# Patient Record
Sex: FEMALE | Race: WHITE | NOT HISPANIC OR LATINO | Employment: UNEMPLOYED | URBAN - METROPOLITAN AREA
[De-identification: names, ages, dates, MRNs, and addresses within clinical notes are randomized per-mention and may not be internally consistent; named-entity substitution may affect disease eponyms.]

---

## 2020-02-26 ENCOUNTER — APPOINTMENT (OUTPATIENT)
Dept: LAB | Facility: HOSPITAL | Age: 40
End: 2020-02-26

## 2020-02-26 ENCOUNTER — TRANSCRIBE ORDERS (OUTPATIENT)
Dept: LAB | Facility: HOSPITAL | Age: 40
End: 2020-02-26

## 2020-02-26 ENCOUNTER — LAB REQUISITION (OUTPATIENT)
Dept: LAB | Facility: HOSPITAL | Age: 40
End: 2020-02-26

## 2020-02-26 DIAGNOSIS — Z3A.25 25 WEEKS GESTATION OF PREGNANCY: Primary | ICD-10-CM

## 2020-02-26 DIAGNOSIS — Z00.00 ROUTINE GENERAL MEDICAL EXAMINATION AT A HEALTH CARE FACILITY: ICD-10-CM

## 2020-02-26 DIAGNOSIS — Z34.92 SECOND TRIMESTER PREGNANCY: ICD-10-CM

## 2020-02-26 PROCEDURE — 36415 COLL VENOUS BLD VENIPUNCTURE: CPT | Performed by: NURSE PRACTITIONER

## 2020-03-24 ENCOUNTER — HOSPITAL ENCOUNTER (INPATIENT)
Facility: HOSPITAL | Age: 40
LOS: 2 days | Discharge: HOME | End: 2020-03-26
Attending: NURSE PRACTITIONER | Admitting: OBSTETRICS & GYNECOLOGY

## 2020-03-24 ENCOUNTER — ANESTHESIA EVENT (OUTPATIENT)
Dept: LABOR AND DELIVERY | Facility: HOSPITAL | Age: 40
End: 2020-03-24

## 2020-03-24 ENCOUNTER — ANESTHESIA (OUTPATIENT)
Dept: LABOR AND DELIVERY | Facility: HOSPITAL | Age: 40
End: 2020-03-24

## 2020-03-24 PROBLEM — O42.919 PRETERM PREMATURE RUPTURE OF MEMBRANES (PPROM) WITH UNKNOWN ONSET OF LABOR: Status: ACTIVE | Noted: 2020-03-24

## 2020-03-24 LAB
ABO GROUP BLD: NORMAL
ALP SERPL-CCNC: 190 U/L (ref 39–117)
ALT SERPL W P-5'-P-CCNC: 19 U/L (ref 1–33)
AMPHET+METHAMPHET UR QL: NEGATIVE
AMPHETAMINES UR QL: NEGATIVE
AST SERPL-CCNC: 32 U/L (ref 1–32)
BARBITURATES UR QL SCN: NEGATIVE
BENZODIAZ UR QL SCN: NEGATIVE
BILIRUB SERPL-MCNC: 0.3 MG/DL (ref 0.2–1.2)
BLD GP AB SCN SERPL QL: NEGATIVE
BUPRENORPHINE SERPL-MCNC: POSITIVE NG/ML
CANNABINOIDS SERPL QL: NEGATIVE
COCAINE UR QL: NEGATIVE
CREAT BLD-MCNC: 0.42 MG/DL (ref 0.57–1)
DEPRECATED RDW RBC AUTO: 47.7 FL (ref 37–54)
ERYTHROCYTE [DISTWIDTH] IN BLOOD BY AUTOMATED COUNT: 13.2 % (ref 12.3–15.4)
HCT VFR BLD AUTO: 39 % (ref 34–46.6)
HGB BLD-MCNC: 12.8 G/DL (ref 12–15.9)
LDH SERPL-CCNC: 227 U/L (ref 135–214)
MCH RBC QN AUTO: 31.7 PG (ref 26.6–33)
MCHC RBC AUTO-ENTMCNC: 32.8 G/DL (ref 31.5–35.7)
MCV RBC AUTO: 96.5 FL (ref 79–97)
METHADONE UR QL SCN: NEGATIVE
OPIATES UR QL: NEGATIVE
OXYCODONE UR QL SCN: NEGATIVE
PCP UR QL SCN: NEGATIVE
PLATELET # BLD AUTO: 406 10*3/MM3 (ref 140–450)
PMV BLD AUTO: 9 FL (ref 6–12)
PROPOXYPH UR QL: NEGATIVE
RBC # BLD AUTO: 4.04 10*6/MM3 (ref 3.77–5.28)
RH BLD: POSITIVE
T&S EXPIRATION DATE: NORMAL
TRICYCLICS UR QL SCN: NEGATIVE
URATE SERPL-MCNC: 3.2 MG/DL (ref 2.4–5.7)
WBC NRBC COR # BLD: 14.42 10*3/MM3 (ref 3.4–10.8)

## 2020-03-24 PROCEDURE — 86900 BLOOD TYPING SEROLOGIC ABO: CPT | Performed by: OBSTETRICS & GYNECOLOGY

## 2020-03-24 PROCEDURE — 82247 BILIRUBIN TOTAL: CPT | Performed by: OBSTETRICS & GYNECOLOGY

## 2020-03-24 PROCEDURE — 51703 INSERT BLADDER CATH COMPLEX: CPT

## 2020-03-24 PROCEDURE — 86850 RBC ANTIBODY SCREEN: CPT | Performed by: OBSTETRICS & GYNECOLOGY

## 2020-03-24 PROCEDURE — 84075 ASSAY ALKALINE PHOSPHATASE: CPT | Performed by: OBSTETRICS & GYNECOLOGY

## 2020-03-24 PROCEDURE — 82565 ASSAY OF CREATININE: CPT | Performed by: OBSTETRICS & GYNECOLOGY

## 2020-03-24 PROCEDURE — 59025 FETAL NON-STRESS TEST: CPT

## 2020-03-24 PROCEDURE — 25010000002 PENICILLIN G POTASSIUM PER 600000 UNITS: Performed by: OBSTETRICS & GYNECOLOGY

## 2020-03-24 PROCEDURE — 36415 COLL VENOUS BLD VENIPUNCTURE: CPT | Performed by: OBSTETRICS & GYNECOLOGY

## 2020-03-24 PROCEDURE — 99221 1ST HOSP IP/OBS SF/LOW 40: CPT | Performed by: OBSTETRICS & GYNECOLOGY

## 2020-03-24 PROCEDURE — 80306 DRUG TEST PRSMV INSTRMNT: CPT | Performed by: OBSTETRICS & GYNECOLOGY

## 2020-03-24 PROCEDURE — 84460 ALANINE AMINO (ALT) (SGPT): CPT | Performed by: OBSTETRICS & GYNECOLOGY

## 2020-03-24 PROCEDURE — 25010000002 ONDANSETRON PER 1 MG: Performed by: OBSTETRICS & GYNECOLOGY

## 2020-03-24 PROCEDURE — 88307 TISSUE EXAM BY PATHOLOGIST: CPT | Performed by: OBSTETRICS & GYNECOLOGY

## 2020-03-24 PROCEDURE — 86901 BLOOD TYPING SEROLOGIC RH(D): CPT | Performed by: OBSTETRICS & GYNECOLOGY

## 2020-03-24 PROCEDURE — 84450 TRANSFERASE (AST) (SGOT): CPT | Performed by: OBSTETRICS & GYNECOLOGY

## 2020-03-24 PROCEDURE — 85027 COMPLETE CBC AUTOMATED: CPT | Performed by: OBSTETRICS & GYNECOLOGY

## 2020-03-24 PROCEDURE — G0480 DRUG TEST DEF 1-7 CLASSES: HCPCS | Performed by: OBSTETRICS & GYNECOLOGY

## 2020-03-24 PROCEDURE — 25010000002 ROPIVACAINE PER 1 MG: Performed by: NURSE ANESTHETIST, CERTIFIED REGISTERED

## 2020-03-24 PROCEDURE — 83615 LACTATE (LD) (LDH) ENZYME: CPT | Performed by: OBSTETRICS & GYNECOLOGY

## 2020-03-24 PROCEDURE — 25010000002 FENTANYL CITRATE (PF) 100 MCG/2ML SOLUTION: Performed by: NURSE ANESTHETIST, CERTIFIED REGISTERED

## 2020-03-24 PROCEDURE — C1755 CATHETER, INTRASPINAL: HCPCS

## 2020-03-24 PROCEDURE — C1755 CATHETER, INTRASPINAL: HCPCS | Performed by: ANESTHESIOLOGY

## 2020-03-24 PROCEDURE — 84550 ASSAY OF BLOOD/URIC ACID: CPT | Performed by: OBSTETRICS & GYNECOLOGY

## 2020-03-24 RX ORDER — DOCUSATE SODIUM 100 MG/1
100 CAPSULE, LIQUID FILLED ORAL 2 TIMES DAILY
Status: DISCONTINUED | OUTPATIENT
Start: 2020-03-24 | End: 2020-03-26 | Stop reason: HOSPADM

## 2020-03-24 RX ORDER — ONDANSETRON 4 MG/1
4 TABLET, FILM COATED ORAL EVERY 6 HOURS PRN
Status: DISCONTINUED | OUTPATIENT
Start: 2020-03-24 | End: 2020-03-24 | Stop reason: HOSPADM

## 2020-03-24 RX ORDER — MORPHINE SULFATE 2 MG/ML
2 INJECTION, SOLUTION INTRAMUSCULAR; INTRAVENOUS
Status: DISCONTINUED | OUTPATIENT
Start: 2020-03-24 | End: 2020-03-24 | Stop reason: HOSPADM

## 2020-03-24 RX ORDER — TRISODIUM CITRATE DIHYDRATE AND CITRIC ACID MONOHYDRATE 500; 334 MG/5ML; MG/5ML
30 SOLUTION ORAL ONCE
Status: DISCONTINUED | OUTPATIENT
Start: 2020-03-24 | End: 2020-03-24 | Stop reason: HOSPADM

## 2020-03-24 RX ORDER — PROMETHAZINE HYDROCHLORIDE 25 MG/ML
12.5 INJECTION, SOLUTION INTRAMUSCULAR; INTRAVENOUS EVERY 4 HOURS PRN
Status: DISCONTINUED | OUTPATIENT
Start: 2020-03-24 | End: 2020-03-25

## 2020-03-24 RX ORDER — ACETAMINOPHEN 325 MG/1
650 TABLET ORAL EVERY 4 HOURS PRN
Status: DISCONTINUED | OUTPATIENT
Start: 2020-03-24 | End: 2020-03-24 | Stop reason: HOSPADM

## 2020-03-24 RX ORDER — SODIUM CHLORIDE 0.9 % (FLUSH) 0.9 %
1-10 SYRINGE (ML) INJECTION AS NEEDED
Status: DISCONTINUED | OUTPATIENT
Start: 2020-03-24 | End: 2020-03-25

## 2020-03-24 RX ORDER — PROMETHAZINE HYDROCHLORIDE 25 MG/ML
12.5 INJECTION, SOLUTION INTRAMUSCULAR; INTRAVENOUS EVERY 6 HOURS PRN
Status: DISCONTINUED | OUTPATIENT
Start: 2020-03-24 | End: 2020-03-24 | Stop reason: HOSPADM

## 2020-03-24 RX ORDER — PROMETHAZINE HYDROCHLORIDE 12.5 MG/1
12.5 SUPPOSITORY RECTAL EVERY 6 HOURS PRN
Status: DISCONTINUED | OUTPATIENT
Start: 2020-03-24 | End: 2020-03-24 | Stop reason: HOSPADM

## 2020-03-24 RX ORDER — PROMETHAZINE HYDROCHLORIDE 12.5 MG/1
12.5 TABLET ORAL EVERY 6 HOURS PRN
Status: DISCONTINUED | OUTPATIENT
Start: 2020-03-24 | End: 2020-03-24 | Stop reason: HOSPADM

## 2020-03-24 RX ORDER — BUPRENORPHINE 2 MG/1
1 TABLET SUBLINGUAL DAILY
COMMUNITY

## 2020-03-24 RX ORDER — OXYTOCIN-SODIUM CHLORIDE 0.9% IV SOLN 30 UNIT/500ML 30-0.9/5 UT/ML-%
650 SOLUTION INTRAVENOUS ONCE
Status: COMPLETED | OUTPATIENT
Start: 2020-03-24 | End: 2020-03-24

## 2020-03-24 RX ORDER — ONDANSETRON 4 MG/1
4 TABLET, FILM COATED ORAL EVERY 6 HOURS PRN
Status: DISCONTINUED | OUTPATIENT
Start: 2020-03-24 | End: 2020-03-26 | Stop reason: HOSPADM

## 2020-03-24 RX ORDER — LIDOCAINE HYDROCHLORIDE 10 MG/ML
5 INJECTION, SOLUTION EPIDURAL; INFILTRATION; INTRACAUDAL; PERINEURAL AS NEEDED
Status: DISCONTINUED | OUTPATIENT
Start: 2020-03-24 | End: 2020-03-24 | Stop reason: HOSPADM

## 2020-03-24 RX ORDER — PENICILLIN G 3000000 [IU]/50ML
3 INJECTION, SOLUTION INTRAVENOUS EVERY 4 HOURS
Status: DISCONTINUED | OUTPATIENT
Start: 2020-03-24 | End: 2020-03-24

## 2020-03-24 RX ORDER — ONDANSETRON 2 MG/ML
4 INJECTION INTRAMUSCULAR; INTRAVENOUS EVERY 6 HOURS PRN
Status: DISCONTINUED | OUTPATIENT
Start: 2020-03-24 | End: 2020-03-25

## 2020-03-24 RX ORDER — IBUPROFEN 600 MG/1
600 TABLET ORAL EVERY 6 HOURS PRN
Status: DISCONTINUED | OUTPATIENT
Start: 2020-03-24 | End: 2020-03-24 | Stop reason: HOSPADM

## 2020-03-24 RX ORDER — ROPIVACAINE HYDROCHLORIDE 2 MG/ML
15 INJECTION, SOLUTION EPIDURAL; INFILTRATION; PERINEURAL CONTINUOUS
Status: DISCONTINUED | OUTPATIENT
Start: 2020-03-24 | End: 2020-03-25

## 2020-03-24 RX ORDER — OXYTOCIN-SODIUM CHLORIDE 0.9% IV SOLN 30 UNIT/500ML 30-0.9/5 UT/ML-%
85 SOLUTION INTRAVENOUS ONCE
Status: COMPLETED | OUTPATIENT
Start: 2020-03-24 | End: 2020-03-24

## 2020-03-24 RX ORDER — ACETAMINOPHEN 325 MG/1
650 TABLET ORAL EVERY 4 HOURS PRN
Status: DISCONTINUED | OUTPATIENT
Start: 2020-03-24 | End: 2020-03-26 | Stop reason: HOSPADM

## 2020-03-24 RX ORDER — SODIUM CHLORIDE, SODIUM LACTATE, POTASSIUM CHLORIDE, CALCIUM CHLORIDE 600; 310; 30; 20 MG/100ML; MG/100ML; MG/100ML; MG/100ML
125 INJECTION, SOLUTION INTRAVENOUS CONTINUOUS
Status: DISCONTINUED | OUTPATIENT
Start: 2020-03-24 | End: 2020-03-25

## 2020-03-24 RX ORDER — FENTANYL CITRATE 50 UG/ML
INJECTION, SOLUTION INTRAMUSCULAR; INTRAVENOUS AS NEEDED
Status: DISCONTINUED | OUTPATIENT
Start: 2020-03-24 | End: 2020-03-24 | Stop reason: SURG

## 2020-03-24 RX ORDER — PRENATAL WITH FERROUS FUM AND FOLIC ACID 3080; 920; 120; 400; 22; 1.84; 3; 20; 10; 1; 12; 200; 27; 25; 2 [IU]/1; [IU]/1; MG/1; [IU]/1; MG/1; MG/1; MG/1; MG/1; MG/1; MG/1; UG/1; MG/1; MG/1; MG/1; MG/1
1 TABLET ORAL DAILY
COMMUNITY

## 2020-03-24 RX ORDER — DIPHENHYDRAMINE HYDROCHLORIDE 50 MG/ML
12.5 INJECTION INTRAMUSCULAR; INTRAVENOUS EVERY 8 HOURS PRN
Status: DISCONTINUED | OUTPATIENT
Start: 2020-03-24 | End: 2020-03-24 | Stop reason: HOSPADM

## 2020-03-24 RX ORDER — SODIUM CHLORIDE 0.9 % (FLUSH) 0.9 %
3 SYRINGE (ML) INJECTION EVERY 12 HOURS SCHEDULED
Status: DISCONTINUED | OUTPATIENT
Start: 2020-03-24 | End: 2020-03-24 | Stop reason: HOSPADM

## 2020-03-24 RX ORDER — OXYTOCIN-SODIUM CHLORIDE 0.9% IV SOLN 30 UNIT/500ML 30-0.9/5 UT/ML-%
2-24 SOLUTION INTRAVENOUS
Status: DISCONTINUED | OUTPATIENT
Start: 2020-03-24 | End: 2020-03-25

## 2020-03-24 RX ORDER — LANOLIN
CREAM (ML) TOPICAL
Status: DISCONTINUED | OUTPATIENT
Start: 2020-03-24 | End: 2020-03-25

## 2020-03-24 RX ORDER — LIDOCAINE HYDROCHLORIDE AND EPINEPHRINE 15; 5 MG/ML; UG/ML
INJECTION, SOLUTION EPIDURAL AS NEEDED
Status: DISCONTINUED | OUTPATIENT
Start: 2020-03-24 | End: 2020-03-24 | Stop reason: SURG

## 2020-03-24 RX ORDER — SIMETHICONE 80 MG
80 TABLET,CHEWABLE ORAL 4 TIMES DAILY PRN
Status: DISCONTINUED | OUTPATIENT
Start: 2020-03-24 | End: 2020-03-26 | Stop reason: HOSPADM

## 2020-03-24 RX ORDER — ONDANSETRON 2 MG/ML
4 INJECTION INTRAMUSCULAR; INTRAVENOUS ONCE AS NEEDED
Status: DISCONTINUED | OUTPATIENT
Start: 2020-03-24 | End: 2020-03-24 | Stop reason: HOSPADM

## 2020-03-24 RX ORDER — OXYCODONE HYDROCHLORIDE AND ACETAMINOPHEN 5; 325 MG/1; MG/1
2 TABLET ORAL EVERY 4 HOURS PRN
Status: DISCONTINUED | OUTPATIENT
Start: 2020-03-24 | End: 2020-03-24 | Stop reason: HOSPADM

## 2020-03-24 RX ORDER — ONDANSETRON 2 MG/ML
4 INJECTION INTRAMUSCULAR; INTRAVENOUS EVERY 6 HOURS PRN
Status: DISCONTINUED | OUTPATIENT
Start: 2020-03-24 | End: 2020-03-24 | Stop reason: HOSPADM

## 2020-03-24 RX ORDER — PROMETHAZINE HYDROCHLORIDE 12.5 MG/1
12.5 TABLET ORAL EVERY 4 HOURS PRN
Status: DISCONTINUED | OUTPATIENT
Start: 2020-03-24 | End: 2020-03-26 | Stop reason: HOSPADM

## 2020-03-24 RX ORDER — NICOTINE 21 MG/24HR
1 PATCH, TRANSDERMAL 24 HOURS TRANSDERMAL
Status: DISCONTINUED | OUTPATIENT
Start: 2020-03-24 | End: 2020-03-24 | Stop reason: SDUPTHER

## 2020-03-24 RX ORDER — SODIUM CHLORIDE 0.9 % (FLUSH) 0.9 %
10 SYRINGE (ML) INJECTION AS NEEDED
Status: DISCONTINUED | OUTPATIENT
Start: 2020-03-24 | End: 2020-03-24 | Stop reason: HOSPADM

## 2020-03-24 RX ORDER — NICOTINE 21 MG/24HR
1 PATCH, TRANSDERMAL 24 HOURS TRANSDERMAL
Status: DISCONTINUED | OUTPATIENT
Start: 2020-03-24 | End: 2020-03-25

## 2020-03-24 RX ORDER — EPHEDRINE SULFATE/0.9% NACL/PF 25 MG/5 ML
10 SYRINGE (ML) INTRAVENOUS
Status: DISCONTINUED | OUTPATIENT
Start: 2020-03-24 | End: 2020-03-24 | Stop reason: HOSPADM

## 2020-03-24 RX ORDER — MAGNESIUM CARB/ALUMINUM HYDROX 105-160MG
30 TABLET,CHEWABLE ORAL ONCE
Status: DISCONTINUED | OUTPATIENT
Start: 2020-03-24 | End: 2020-03-24 | Stop reason: HOSPADM

## 2020-03-24 RX ORDER — IBUPROFEN 600 MG/1
600 TABLET ORAL EVERY 6 HOURS PRN
Status: DISCONTINUED | OUTPATIENT
Start: 2020-03-24 | End: 2020-03-26 | Stop reason: HOSPADM

## 2020-03-24 RX ORDER — BUPRENORPHINE 2 MG/1
1 TABLET SUBLINGUAL DAILY
Status: DISCONTINUED | OUTPATIENT
Start: 2020-03-24 | End: 2020-03-26 | Stop reason: HOSPADM

## 2020-03-24 RX ORDER — FAMOTIDINE 10 MG/ML
20 INJECTION, SOLUTION INTRAVENOUS ONCE AS NEEDED
Status: DISCONTINUED | OUTPATIENT
Start: 2020-03-24 | End: 2020-03-24 | Stop reason: HOSPADM

## 2020-03-24 RX ADMIN — NICOTINE 1 PATCH: 14 PATCH, EXTENDED RELEASE TRANSDERMAL at 04:21

## 2020-03-24 RX ADMIN — DOCUSATE SODIUM 100 MG: 100 CAPSULE, LIQUID FILLED ORAL at 18:25

## 2020-03-24 RX ADMIN — IBUPROFEN 600 MG: 600 TABLET, FILM COATED ORAL at 18:25

## 2020-03-24 RX ADMIN — ONDANSETRON 4 MG: 2 INJECTION INTRAMUSCULAR; INTRAVENOUS at 09:52

## 2020-03-24 RX ADMIN — PENICILLIN G 3 MILLION UNITS: 3000000 INJECTION, SOLUTION INTRAVENOUS at 13:11

## 2020-03-24 RX ADMIN — OXYTOCIN 2 MILLI-UNITS/MIN: 10 INJECTION, SOLUTION INTRAMUSCULAR; INTRAVENOUS at 09:49

## 2020-03-24 RX ADMIN — SODIUM CHLORIDE, POTASSIUM CHLORIDE, SODIUM LACTATE AND CALCIUM CHLORIDE 125 ML/HR: 600; 310; 30; 20 INJECTION, SOLUTION INTRAVENOUS at 04:10

## 2020-03-24 RX ADMIN — OXYCODONE HYDROCHLORIDE AND ACETAMINOPHEN 2 TABLET: 5; 325 TABLET ORAL at 16:48

## 2020-03-24 RX ADMIN — PENICILLIN G 3 MILLION UNITS: 3000000 INJECTION, SOLUTION INTRAVENOUS at 08:35

## 2020-03-24 RX ADMIN — OXYTOCIN 85 ML/HR: 10 INJECTION, SOLUTION INTRAMUSCULAR; INTRAVENOUS at 15:18

## 2020-03-24 RX ADMIN — Medication: at 22:32

## 2020-03-24 RX ADMIN — NICOTINE 1 PATCH: 14 PATCH, EXTENDED RELEASE TRANSDERMAL at 17:05

## 2020-03-24 RX ADMIN — ROPIVACAINE HYDROCHLORIDE 15 ML/HR: 2 INJECTION, SOLUTION EPIDURAL; INFILTRATION at 08:48

## 2020-03-24 RX ADMIN — LIDOCAINE HYDROCHLORIDE AND EPINEPHRINE 1 ML: 15; 5 INJECTION, SOLUTION EPIDURAL at 08:44

## 2020-03-24 RX ADMIN — SODIUM CHLORIDE 5 MILLION UNITS: 900 INJECTION INTRAVENOUS at 04:11

## 2020-03-24 RX ADMIN — LIDOCAINE HYDROCHLORIDE AND EPINEPHRINE 3 ML: 15; 5 INJECTION, SOLUTION EPIDURAL at 08:41

## 2020-03-24 RX ADMIN — BUPRENORPHINE HCL 1 MG: 2 TABLET SUBLINGUAL at 19:39

## 2020-03-24 RX ADMIN — OXYTOCIN 650 ML/HR: 10 INJECTION, SOLUTION INTRAMUSCULAR; INTRAVENOUS at 14:45

## 2020-03-24 RX ADMIN — WITCH HAZEL 1 PAD: 500 SOLUTION RECTAL; TOPICAL at 22:32

## 2020-03-24 RX ADMIN — ROPIVACAINE HYDROCHLORIDE 9 ML: 5 INJECTION, SOLUTION EPIDURAL; INFILTRATION; PERINEURAL at 08:46

## 2020-03-24 RX ADMIN — FENTANYL CITRATE 100 MCG: 50 INJECTION, SOLUTION INTRAMUSCULAR; INTRAVENOUS at 08:44

## 2020-03-24 NOTE — ANESTHESIA PROCEDURE NOTES
Labor Epidural      Patient reassessed immediately prior to procedure    Patient location during procedure: OB  Performed By  CRNA: Mary Isabel CRNA  Preanesthetic Checklist  Completed: patient identified, surgical consent, pre-op evaluation, timeout performed, IV checked, risks and benefits discussed and monitors and equipment checked  Prep:  Pt Position:sitting  Sterile Tech:cap, gloves, mask and sterile barrier  Prep:DuraPrep  Monitoring:blood pressure monitoring  Epidural Block Procedure:  Approach:midline  Guidance:palpation technique  Location:L3-L4  Needle Type:Tuohy  Needle Gauge:17 G  Loss of Resistance Medium: saline  Loss of Resistance: 5cm  Cath Depth at skin:9 cm  Paresthesia: none  Aspiration:negative  Test Dose:negative  Number of Attempts: 1  Post Assessment:  Dressing:occlusive dressing applied and secured with tape  Pt Tolerance:patient tolerated the procedure well with no apparent complications  Complications:no

## 2020-03-24 NOTE — PROGRESS NOTES
Comfortable with epidural in place  sve 4/80/-1  Continued clear fluid noted  Contractions irregular  fhr cat 1  Continue gbs prophylaxis antibiotics  Start pitocin augmentation

## 2020-03-24 NOTE — L&D DELIVERY NOTE
[facility]    Vaginal Delivery Note    [facility]      Patient name:  Georgie Bean  : 1980  MRN: [id number]  CSN: [id number]  Date of Service:  20      Patient Active Problem List   Diagnosis   •  premature rupture of membranes (PPROM) with unknown onset of labor     Current Gestational Age:  35w0d    Delivery details     Delivery: Vaginal, Spontaneous     YOB: 2019    Time of Birth: 2:41 PM      Anesthesia: Epidural     Delivering clinician: Lorena Casper    Forceps?   No   Vacuum? No    Shoulder dystocia present: No      Delivery narrative:  Patient delivered a VMI over intact perineum.  Cord milked x 4, doubly clamped and cut. Baby placed on maternal abdomen with vigorous cry.  Placenta delivered spontaneously and intact with 3 a vessel cord.  Uterus firm on bimanual massage.     Infant details    Findings: male  infant     Infant observations: Weight: No birth weight on file.   Length:    in   Apgars:    @ 1 minute /       @ 5 minutes         Placenta, Cord, and Fluid    Placenta delivered  Spontaneous  at   3/24/2020  2:45 PM     Cord: 3 vessels  present.   Nuchal Cord?  no   Cord blood obtained: Yes      Episiotomy, Laceration, and Repair            Estimated Blood Loss:   250 mL           Complications  none    Disposition  Mother to Mother Baby/Postpartum  in stable condition currently.  Baby to NBN  in stable condition currently.      Lorena Casper MD  20  14:57

## 2020-03-24 NOTE — ANESTHESIA PREPROCEDURE EVALUATION
Anesthesia Evaluation     Patient summary reviewed and Nursing notes reviewed   NPO Solid Status: > 2 hours  NPO Liquid Status: > 2 hours           Airway   Mallampati: II  TM distance: >3 FB  Neck ROM: full  No difficulty expected  Dental      Pulmonary - negative pulmonary ROS   Cardiovascular - negative cardio ROS        Neuro/Psych- negative ROS  GI/Hepatic/Renal/Endo - negative ROS     Musculoskeletal (-) negative ROS    Abdominal    Substance History   (+) drug use      Comment: Rx subutex   OB/GYN negative ob/gyn ROS         Other                        Anesthesia Plan    ASA 3     epidural       Anesthetic plan, all risks, benefits, and alternatives have been provided, discussed and informed consent has been obtained with: patient.

## 2020-03-24 NOTE — H&P
YUAN Shen  Obstetric History and Physical    CC:  Contractions and water broke     Subjective     Patient is a 39 y.o. female  currently at 35w0d, who presents with with having several gushes of fluid that started about 21:00.  She continues to leak here with.  Her contractions are not too strong yet, but she does feel some.  She said with her last it, went very quickly and she missed her opportunity for an epidural.    Denies vaginal bleeding, but does have some bloody show with SVE.   She says she has not any complications during this pregnancy.             Prenatal Information:  Prenatal Results     POC Urine Glucose/Protein     Test Value Reference Range Date Time    Urine Glucose        Urine Protein              Initial Prenatal Labs     Test Value Reference Range Date Time    Hemoglobin        Hematocrit        Platelets 406 10*3/mm3 140 - 450 20 0247    Rubella IgG        Hepatitis B SAg        Hepatitis C Ab        RPR        ABO        Rh        Antibody Screen        HIV        Urine Culture        Gonorrhea        Chlamydia        TSH              2nd and 3rd Trimester     Test Value Reference Range Date Time    Hemoglobin (repeated) 12.8 g/dL 12.0 - 15.9 20 0247    Hematocrit (repeated) 39.0 % 34.0 - 46.6 20 0247    GCT        Antibody Screen (repeated)        GTT Fasting        GTT 1 Hr        GTT 2 Hr        GTT 3 Hr        Group B Strep              Drug Screening     Test Value Reference Range Date Time    Amphetamine Screen Negative  Negative 20 0130    Barbiturate Screen Negative  Negative 20 0130    Benzodiazepine Screen Negative  Negative 20 0130    Methadone Screen Negative  Negative 20 0130    Phencyclidine Screen Negative  Negative 20 0130    Opiates Screen Negative  Negative 20 0130    THC Screen Negative  Negative 20 0130    Cocaine Screen Negative  Negative 20 0130    Propoxyphene Screen Negative  Negative 20  0130    Buprenorphine Screen Positive  Negative 03/24/20 0130    Methamphetamine Screen Negative  Negative 03/24/20 0130    Oxycodone Screen Negative  Negative 03/24/20 0130    Tricyclic Antidepressants Screen Negative  Negative 03/24/20 0130          Other (Risk screening)     Test Value Reference Range Date Time    Varicella IgG        Parvovirus IgG        CMV IgG        Cystic Fibrosis        Hemoglobin electrophoresis        NIPT        MSAFP-4        AFP (for NTD only)                  External Prenatal Results     Pregnancy Outside Results - Transcribed From Office Records - See Scanned Records For Details     Test Value Date Time    Hgb 12.8 g/dL 03/24/20 0247    Hct 39.0 % 03/24/20 0247    ABO       Rh       Antibody Screen       Glucose Fasting GTT       Glucose Tolerance Test 1 hour       Glucose Tolerance Test 3 hour       Gonorrhea (discrete)       Chlamydia (discrete)       RPR       VDRL       Syphilis Antibody       Rubella       HBsAg       Herpes Simplex Virus PCR       Herpes Simplex VIrus Culture       HIV       Hep C RNA Quant PCR       Hep C Antibody       AFP       Group B Strep       GBS Susceptibility to Clindamycin       GBS Susceptibility to Erythromycin       Fetal Fibronectin       Genetic Testing, Maternal Blood             Drug Screening     Test Value Date Time    Urine Drug Screen       Amphetamine Screen Negative  03/24/20 0130    Barbiturate Screen Negative  03/24/20 0130    Benzodiazepine Screen Negative  03/24/20 0130    Methadone Screen Negative  03/24/20 0130    Phencyclidine Screen Negative  03/24/20 0130    Opiates Screen Negative  03/24/20 0130    THC Screen Negative  03/24/20 0130    Cocaine Screen       Propoxyphene Screen Negative  03/24/20 0130    Buprenorphine Screen Positive  03/24/20 0130    Methamphetamine Screen       Oxycodone Screen Negative  03/24/20 0130    Tricyclic Antidepressants Screen Negative  03/24/20 0130                 Past OB History:     OB History     Para Term  AB Living   5 4 3 1 0 3   SAB TAB Ectopic Molar Multiple Live Births   0 0 0 0 0 3      # Outcome Date GA Lbr Sanjeev/2nd Weight Sex Delivery Anes PTL Lv   5 Current            4 Term 12 38w0d  2722 g (6 lb) M Vag-Spont EPI  LING   3 Term 08 39w0d  3515 g (7 lb 12 oz) F Vag-Spont EPI  LING   2  04 33w0d  2126 g (4 lb 11 oz) M Vag-Spont   LING   1 Term 99 39w0d  2750 g (6 lb 1 oz) M Vag-Spont EPI         Past Medical History: History reviewed. No pertinent past medical history.   Past Surgical History History reviewed. No pertinent surgical history.   Family History: History reviewed. No pertinent family history.   Social History:  reports that she has been smoking cigarettes. She has been smoking about 0.50 packs per day. She has never used smokeless tobacco.   reports that she does not drink alcohol.   reports that she has current or past drug history.        Review of Systems:  Denies chest pain, and SOA.  All other pertinent positives and negatives are addresses in the subjective       Objective     Vital Signs Range for the last 24 hours  Temperature: Temp:  [98.8 °F (37.1 °C)-99 °F (37.2 °C)] 98.8 °F (37.1 °C)   Temp Source: Temp src: Oral   BP: BP: (133-137)/(77-87) 133/77   Pulse: Heart Rate:  [85-96] 85   Respirations: Resp:  [16-18] 16   SPO2:     O2 Amount (l/min):     O2 Devices     Weight: Weight:  [59 kg (130 lb)] 59 kg (130 lb)     Physical Examination: General appearance - oriented to person, place, and time and anxious  Chest - no tachypnea, retractions or cyanosis  Heart - normal rate and regular rhythm, S1 and S2 normal  Abdomen - soft, nontender, nondistended, no masses or organomegaly  bowel sounds normal  Extremities - no pedal edema noted    Presentation: Cephalic    Cervix: Exam by:  M.D.    Dilation:  3   Effacement:  70   Station:  -1     Fetal Heart Rate Assessment   Method:     Beats/min:     Baseline:  140s   Variability:  mod   Accels:   y   Decels:  n   Tracing Category:  1     Uterine Assessment   Method: Method: per patient report   Frequency (min):     Ctx Count in 10 min:     Duration:     Intensity: Contraction Intensity: irregular   Intensity by IUPC:     Resting Tone:     Resting Tone by IUPC:     [place] Units:       Laboratory Results:   Lab Results   Component Value Date     2020    HGB 12.8 2020    HCT 39.0 2020    WBC 14.42 (H) 2020         Assessment/Plan        premature rupture of membranes (PPROM) with unknown onset of labor      Assessment & Plan    Assessment:  1.  Intrauterine pregnancy at 35w0d gestation with reassuring fetal status.    2.  PPROM   3.  GBS unknown   Plan:   1. Admit   2. IV, CBC, T&S, UDS   3. PCN for GBS   4. Monitor   5. Augment prn   6. Continue home meds       Richard Cao MD  3/24/2020  04:07

## 2020-03-24 NOTE — PAYOR COMM NOTE
"Chelsi Bean (39 y.o. Female)     Auth#[id number]    From: Zee Tyler  #[phone]  Fax#[phone]      Date of Birth Social Security Number Address Home Phone MRN    1980  [id number]  [place] [id number] [phone] [id number]    Anabaptist Marital Status          Hoahaoism        Admission Date Admission Type Admitting Provider Attending Provider Department, Room/Bed    3/24/20 Elective Brooke Tariq DO Tucker, Sabrina, CNM [facility] LABOR DELIVERY, N302/    Discharge Date Discharge Disposition Discharge Destination                       Attending Provider:  Damaris Calhoun CNM    Allergies:  No Known Allergies    Isolation:  None   Infection:  None   Code Status:  CPR    Ht:  157.5 cm (62\")   Wt:  59 kg (130 lb)    Admission Cmt:  None   Principal Problem:  None                Active Insurance as of 3/24/2020     Primary Coverage     Payor Plan Insurance Group Employer/Plan Group    [place] [id number]     Payor Plan Address Payor Plan Phone Number Payor Plan Fax Number Effective Dates    [employer] Claims Office - [address] [address] [phone]  2020 - None Entered    Formerly [facility] [address]       Subscriber Name Subscriber Birth Date Member ID       CHELSI BEAN 1980 [id number]                 Emergency Contacts      (Rel.) Home Phone Work Phone Mobile Phone    ANAYA BEAN (Father) [phone] -- --            Insurance Information                [place] Phone: [phone]    Subscriber: Chelsi Bean Subscriber#: [id number]    Group#: [id number] Precert#:           Problem List           Codes Noted - Resolved       Hospital     premature rupture of membranes (PPROM) with unknown onset of labor ICD-10-CM: O42.919  ICD-9-CM: 658.10 3/24/2020 - Present             History & Physical      Richard Cao MD at 20 0407          [facility]  Obstetric History and " Physical    CC:  Contractions and water broke     Subjective     Patient is a 39 y.o. female  currently at 35w0d, who presents with with having several gushes of fluid that started about 21:00.  She continues to leak here with.  Her contractions are not too strong yet, but she does feel some.  She said with her last it, went very quickly and she missed her opportunity for an epidural.    Denies vaginal bleeding, but does have some bloody show with SVE.   She says she has not any complications during this pregnancy.             Prenatal Information:  Prenatal Results     POC Urine Glucose/Protein     Test Value Reference Range Date Time    Urine Glucose        Urine Protein              Initial Prenatal Labs     Test Value Reference Range Date Time    Hemoglobin        Hematocrit        Platelets 406 10*3/mm3 140 - 450 20 0247    Rubella IgG        Hepatitis B SAg        Hepatitis C Ab        RPR        ABO        Rh        Antibody Screen        HIV        Urine Culture        Gonorrhea        Chlamydia        TSH              2nd and 3rd Trimester     Test Value Reference Range Date Time    Hemoglobin (repeated) 12.8 g/dL 12.0 - 15.9 20 0247    Hematocrit (repeated) 39.0 % 34.0 - 46.6 20 0247    GCT        Antibody Screen (repeated)        GTT Fasting        GTT 1 Hr        GTT 2 Hr        GTT 3 Hr        Group B Strep              Drug Screening     Test Value Reference Range Date Time    Amphetamine Screen Negative  Negative 20 0130    Barbiturate Screen Negative  Negative 20 0130    Benzodiazepine Screen Negative  Negative 20 0130    Methadone Screen Negative  Negative 20 0130    Phencyclidine Screen Negative  Negative 20 0130    Opiates Screen Negative  Negative 20 0130    THC Screen Negative  Negative 20 0130    Cocaine Screen Negative  Negative 20 0130    Propoxyphene Screen Negative  Negative 20 0130    Buprenorphine Screen  Positive  Negative 20 0130    Methamphetamine Screen Negative  Negative 20 0130    Oxycodone Screen Negative  Negative 20 0130    Tricyclic Antidepressants Screen Negative  Negative 20 013          Other (Risk screening)     Test Value Reference Range Date Time    Varicella IgG        Parvovirus IgG        CMV IgG        Cystic Fibrosis        Hemoglobin electrophoresis        NIPT        MSAFP-4        AFP (for NTD only)                  External Prenatal Results     Pregnancy Outside Results - Transcribed From Office Records - See Scanned Records For Details     Test Value Date Time    Hgb 12.8 g/dL 20 0247    Hct 39.0 % 20 0247    ABO       Rh       Antibody Screen       Glucose Fasting GTT       Glucose Tolerance Test 1 hour       Glucose Tolerance Test 3 hour       Gonorrhea (discrete)       Chlamydia (discrete)       RPR       VDRL       Syphilis Antibody       Rubella       HBsAg       Herpes Simplex Virus PCR       Herpes Simplex VIrus Culture       HIV       Hep C RNA Quant PCR       Hep C Antibody       AFP       Group B Strep       GBS Susceptibility to Clindamycin       GBS Susceptibility to Erythromycin       Fetal Fibronectin       Genetic Testing, Maternal Blood             Drug Screening     Test Value Date Time    Urine Drug Screen       Amphetamine Screen Negative  20 0130    Barbiturate Screen Negative  20 0130    Benzodiazepine Screen Negative  20 0130    Methadone Screen Negative  20 0130    Phencyclidine Screen Negative  20 0130    Opiates Screen Negative  20 0130    THC Screen Negative  20 0130    Cocaine Screen       Propoxyphene Screen Negative  20 0130    Buprenorphine Screen Positive  20 0130    Methamphetamine Screen       Oxycodone Screen Negative  20 0130    Tricyclic Antidepressants Screen Negative  20 0130                 Past OB History:     OB History    Para Term  AB  Living   5 4 3 1 0 3   SAB TAB Ectopic Molar Multiple Live Births   0 0 0 0 0 3      # Outcome Date GA Lbr Sanjeev/2nd Weight Sex Delivery Anes PTL Lv   5 Current            4 Term 12 38w0d  2722 g (6 lb) M Vag-Spont EPI  LING   3 Term 08 39w0d  3515 g (7 lb 12 oz) F Vag-Spont EPI  LING   2  04 33w0d  2126 g (4 lb 11 oz) M Vag-Spont   LING   1 Term 99 39w0d  2750 g (6 lb 1 oz) M Vag-Spont EPI         Past Medical History: History reviewed. No pertinent past medical history.   Past Surgical History History reviewed. No pertinent surgical history.   Family History: History reviewed. No pertinent family history.   Social History:  reports that she has been smoking cigarettes. She has been smoking about 0.50 packs per day. She has never used smokeless tobacco.   reports that she does not drink alcohol.   reports that she has current or past drug history.        Review of Systems:  Denies chest pain, and SOA.  All other pertinent positives and negatives are addresses in the subjective       Objective     Vital Signs Range for the last 24 hours  Temperature: Temp:  [98.8 °F (37.1 °C)-99 °F (37.2 °C)] 98.8 °F (37.1 °C)   Temp Source: Temp src: Oral   BP: BP: (133-137)/(77-87) 133/77   Pulse: Heart Rate:  [85-96] 85   Respirations: Resp:  [16-18] 16   SPO2:     O2 Amount (l/min):     O2 Devices     Weight: Weight:  [59 kg (130 lb)] 59 kg (130 lb)     Physical Examination: General appearance - oriented to person, place, and time and anxious  Chest - no tachypnea, retractions or cyanosis  Heart - normal rate and regular rhythm, S1 and S2 normal  Abdomen - soft, nontender, nondistended, no masses or organomegaly  bowel sounds normal  Extremities - no pedal edema noted    Presentation: Cephalic    Cervix: Exam by:  M.D.    Dilation:  3   Effacement:  70   Station:  -1     Fetal Heart Rate Assessment   Method:     Beats/min:     Baseline:  140s   Variability:  mod   Accels:  y   Decels:  n   Tracing  Category:  1     Uterine Assessment   Method: Method: per patient report   Frequency (min):     Ctx Count in 10 min:     Duration:     Intensity: Contraction Intensity: irregular   Intensity by IUPC:     Resting Tone:     Resting Tone by IUPC:     Kvng Units:       Laboratory Results:   Lab Results   Component Value Date     2020    HGB 12.8 2020    HCT 39.0 2020    WBC 14.42 (H) 2020         Assessment/Plan        premature rupture of membranes (PPROM) with unknown onset of labor      Assessment & Plan    Assessment:  1.  Intrauterine pregnancy at 35w0d gestation with reassuring fetal status.    2.  PPROM   3.  GBS unknown   Plan:   1. Admit   2. IV, CBC, T&S, UDS   3. PCN for GBS   4. Monitor   5. Augment prn   6. Continue home meds       Richard Cao MD  3/24/2020  04:07    Electronically signed by Richard Cao MD at 20 0414     H&P filed by [facility], [facility] at 20 1348     Scan on 3/24/2020: PRENATAL RECORDS   3/24/2020          Electronically signed by Interface, Scans Incoming at 20 1348       Vital Signs (last day)     Date/Time   Temp   Temp src   Pulse   Resp   BP   Patient Position   SpO2    20 1545   --   --   90   18   125/72   --   --    20 1531   --   --   99   --   171/91   --   --    20 1516   --   --   95   --   115/63   --   --    20 1504   98.2 (36.8)   Oral   100   18   117/82   --   --    20 1446   --   --   107   --   144/82   --   --    20 1431   --   --   104   --   124/58   --   --    20 1417   --   --   90   --   117/72   --   --    20 1401   --   --   88   --   104/55   --   --    20 1346   --   --   92   --   101/55   --   --    20 1332   --   --   91   --   104/59   --   --    20 1317   --   --   90   --   101/55   --   --    20 1301   98.5 (36.9)   Oral   90   16   106/57   --   --    20 1246   --   --   96   --   109/62   --   --     03/24/20 1231   --   --   88   --   102/60   --   --    03/24/20 1216   --   --   93   --   105/62   --   --    03/24/20 1201   --   --   97   --   118/67   --   --    03/24/20 1147   --   --   96   --   111/69   --   --    03/24/20 1131   --   --   84   --   113/67   --   --    03/24/20 1116   --   --   88   --   116/70   --   --    03/24/20 1101   98.7 (37.1)   Oral   92   16   116/70   --   --    03/24/20 1046   --   --   93   --   122/72   --   --    03/24/20 1031   --   --   88   --   125/71   --   --    03/24/20 1016   --   --   95   --   121/84   --   --    03/24/20 1002   --   --   100   --   112/76   --   --    03/24/20 0946   --   --   90   --   130/67   --   --    03/24/20 0930   --   --   112   --   130/67   --   --    03/24/20 0900   --   --   103   --   127/59   --   --    03/24/20 0857   98.6 (37)   Oral   111   16   135/60   --   --    03/24/20 0854   --   --   (!) 127   --   142/69   --   --    03/24/20 0851   --   --   109   --   136/62   --   --    03/24/20 0848   --   --   107   --   129/64   --   --    03/24/20 0845   --   --   105   --   168/68   --   --    03/24/20 0842   --   --   96   --   (!) 184/92   --   --    03/24/20 0722   98.7 (37.1)   Oral   95   16   131/79   Lying   --    03/24/20 0352   98.8 (37.1)   Oral   85   16   133/77   Lying   --    03/24/20 0225   --   --   96   --   137/87   --   --    03/24/20 0202   99 (37.2)   Oral   93   18   137/87   Lying   --                Current Facility-Administered Medications   Medication Dose Route Frequency Provider Last Rate Last Dose   • acetaminophen (TYLENOL) tablet 650 mg  650 mg Oral Q4H PRN Richard Cao MD       • acetaminophen (TYLENOL) tablet 650 mg  650 mg Oral Q4H PRN Richard Cao MD       • butorphanol (STADOL) injection 2 mg  2 mg Intravenous Q2H PRN Richard Cao MD       • diphenhydrAMINE (BENADRYL) injection 12.5 mg  12.5 mg Intravenous Q8H PRN Mary Isabel CRNA       • ePHEDrine Sulfate 25 MG/5ML syringe 10 mg   10 mg Intravenous Q10 Min PRN Mary Isabel CRNA       • famotidine (PEPCID) injection 20 mg  20 mg Intravenous Once PRN Mary Isabel CRNA       • ibuprofen (ADVIL,MOTRIN) tablet 600 mg  600 mg Oral Q6H PRN Richard Cao MD       • lactated ringers bolus 1,000 mL  1,000 mL Intravenous Once Richard Cao MD   Stopped at 03/24/20 0411   • lactated ringers bolus 1,000 mL  1,000 mL Intravenous PRN Mary Isabel CRNA       • lactated ringers infusion  125 mL/hr Intravenous Continuous Richard Cao MD   Stopped at 03/24/20 1445   • lactated ringers infusion  125 mL/hr Intravenous Continuous Richard Cao MD   Stopped at 03/24/20 0412   • lidocaine PF 1% (XYLOCAINE) injection 5 mL  5 mL Intradermal PRN Richard Cao MD       • mineral oil liquid 30 mL  30 mL Topical Once Richard Cao MD       • morphine injection 2 mg  2 mg Intravenous Q2H PRN Richard Cao MD       • morphine injection 2 mg  2 mg Intravenous Q2H PRN Richard Cao MD       • nicotine (NICODERM CQ) 14 MG/24HR patch 1 patch  1 patch Transdermal Q24H Richard Cao MD   1 patch at 03/24/20 0421   • ondansetron (ZOFRAN) tablet 4 mg  4 mg Oral Q6H PRN Richard Cao MD        Or   • ondansetron (ZOFRAN) injection 4 mg  4 mg Intravenous Q6H PRN Richard Cao MD   4 mg at 03/24/20 0952   • ondansetron (ZOFRAN) injection 4 mg  4 mg Intravenous Once PRN Mary Isabel CRNA       • oxyCODONE-acetaminophen (PERCOCET) 5-325 MG per tablet 2 tablet  2 tablet Oral Q4H PRN Richard Cao MD       • oxytocin in sodium chloride (PITOCIN) 30 UNIT/500ML infusion solution  2-24 yvrose-units/min Intravenous Titrated Damaris Calhoun CNM   Stopped at 03/24/20 1445   • promethazine (PHENERGAN) injection 12.5 mg  12.5 mg Intravenous Q6H PRN Richard Cao MD        Or   • promethazine (PHENERGAN) injection 12.5 mg  12.5 mg Intramuscular Q6H PRN Richard Cao MD        Or   • promethazine (PHENERGAN) suppository 12.5 mg  12.5 mg Rectal Q6H PRN  Richard Cao MD        Or   • promethazine (PHENERGAN) tablet 12.5 mg  12.5 mg Oral Q6H PRN Richard Cao MD       • promethazine (PHENERGAN) injection 12.5 mg  12.5 mg Intravenous Q6H PRN Richard Cao MD        Or   • promethazine (PHENERGAN) injection 12.5 mg  12.5 mg Intramuscular Q6H PRN Richard Cao MD        Or   • promethazine (PHENERGAN) suppository 12.5 mg  12.5 mg Rectal Q6H PRN Richard Cao MD        Or   • promethazine (PHENERGAN) tablet 12.5 mg  12.5 mg Oral Q6H PRRichard Penn MD       • ropivacaine (NAROPIN) 0.2 % injection  15 mL/hr Epidural Continuous Mary Isabel CRNA   Stopped at 03/24/20 1445   • Sod Citrate-Citric Acid (BICITRA) solution 30 mL  30 mL Oral Once Mary Isabel CRNA   Stopped at 03/24/20 1109   • sodium chloride 0.9 % flush 10 mL  10 mL Intravenous PRN Richard Cao MD       • sodium chloride 0.9 % flush 3 mL  3 mL Intravenous Q12H Richard Cao MD           Lab Results (last 24 hours)     Procedure Component Value Units Date/Time    Tissue Pathology Exam [[id number]] Collected:  03/24/20 1453    Specimen:  Tissue from Placenta Updated:  03/24/20 1518    Preeclampsia Panel [[id number]]  (Abnormal) Collected:  03/24/20 0247    Specimen:  Blood Updated:  03/24/20 0315     Alkaline Phosphatase 190 U/L      ALT (SGPT) 19 U/L      AST (SGOT) 32 U/L      Creatinine 0.42 mg/dL      Total Bilirubin 0.3 mg/dL       U/L      Uric Acid 3.2 mg/dL     Urine Drug Screen - Urine, Clean Catch [[id number]]  (Abnormal) Collected:  03/24/20 0130    Specimen:  Urine, Clean Catch Updated:  03/24/20 0308     THC, Screen, Urine Negative     Phencyclidine (PCP), Urine Negative     Cocaine Screen, Urine Negative     Methamphetamine, Ur Negative     Opiate Screen Negative     Amphetamine Screen, Urine Negative     Benzodiazepine Screen, Urine Negative     Tricyclic Antidepressants Screen Negative     Methadone Screen, Urine Negative     Barbiturates Screen, Urine Negative      Oxycodone Screen, Urine Negative     Propoxyphene Screen Negative     Buprenorphine, Screen, Urine Positive    Narrative:       Cutoff For Drugs Screened:    Amphetamines               500 ng/ml  Barbiturates               200 ng/ml  Benzodiazepines            150 ng/ml  Cocaine                    150 ng/ml  Methadone                  200 ng/ml  Opiates                    100 ng/ml  Phencyclidine               25 ng/ml  THC                            50 ng/ml  Methamphetamine            500 ng/ml  Tricyclic Antidepressants  300 ng/ml  Oxycodone                  100 ng/ml  Propoxyphene               300 ng/ml  Buprenorphine               10 ng/ml    The normal value for all drugs tested is negative. This report includes unconfirmed screening results, with the cutoff values listed, to be used for medical treatment purposes only.  Unconfirmed results must not be used for non-medical purposes such as employment or legal testing.  Clinical consideration should be applied to any drug of abuse test, particularly when unconfirmed results are used.      Buprenorphine Confirm, Urine - Urine, Clean Catch [[id number]] Collected:  03/24/20 0130    Specimen:  Urine, Clean Catch Updated:  03/24/20 0302    CBC (No Diff) [[id number]]  (Abnormal) Collected:  03/24/20 0247    Specimen:  Blood Updated:  03/24/20 0301     WBC 14.42 10*3/mm3      RBC 4.04 10*6/mm3      Hemoglobin 12.8 g/dL      Hematocrit 39.0 %      MCV 96.5 fL      MCH 31.7 pg      MCHC 32.8 g/dL      RDW 13.2 %      RDW-SD 47.7 fl      MPV 9.0 fL      Platelets 406 10*3/mm3         Orders (last 24 hrs)      Start     Ordered    03/24/20 1545  oxytocin in sodium chloride (PITOCIN) 30 UNIT/500ML infusion solution  Once      03/24/20 1450    03/24/20 1501  DIET MESSAGE Please send a cheeseburger and french fries please.  Once     Comments:  Please send a cheeseburger and french fries please.    03/24/20 1501    03/24/20 1455  VTE Prophylaxis Not Indicated: No Risk  Factors (0); </= 3 (Low Risk)  Once      03/24/20 1455    03/24/20 1452  Transfer Patient  Once      03/24/20 1455    03/24/20 1451  Vital Signs Per hospital policy  Per Hospital Policy      03/24/20 1450    03/24/20 1451  Up as Tolerated  Until Discontinued      03/24/20 1450    03/24/20 1451  Fundal & Lochia Check  Per Order Details     Comments:  Every 15 Minutes x4, Then Every 30 Minutes x2, Then Every Shift    03/24/20 1450    03/24/20 1451  Fundal & Lochia Check  Every Shift      03/24/20 1450    03/24/20 1451  Notify Physician (specified)  Until Discontinued      03/24/20 1450    03/24/20 1451  Notify physician (specify)  Until Discontinued      03/24/20 1450    03/24/20 1451  Diet Regular  Diet Effective Now      03/24/20 1450    03/24/20 1451  Advance Diet as Tolerated  Until Discontinued      03/24/20 1450    03/24/20 1451  Nurse May Remove Epidural Catheter After Delivery  Continuous      03/24/20 1450    03/24/20 1451  Transfer to Postpartum When Criteria Met  Until Discontinued      03/24/20 1450    03/24/20 1450  oxytocin in sodium chloride (PITOCIN) 30 UNIT/500ML infusion solution  Once      03/24/20 1450    03/24/20 1450  acetaminophen (TYLENOL) tablet 650 mg  Every 4 Hours PRN      03/24/20 1450    03/24/20 1450  ibuprofen (ADVIL,MOTRIN) tablet 600 mg  Every 6 Hours PRN      03/24/20 1450    03/24/20 1450  morphine injection 2 mg  Every 2 Hours PRN      03/24/20 1450    03/24/20 1450  oxyCODONE-acetaminophen (PERCOCET) 5-325 MG per tablet 2 tablet  Every 4 Hours PRN      03/24/20 1450    03/24/20 1450  morphine injection 2 mg  Every 2 Hours PRN      03/24/20 1450    03/24/20 1450  promethazine (PHENERGAN) injection 12.5 mg  Every 6 Hours PRN      03/24/20 1450    03/24/20 1450  promethazine (PHENERGAN) injection 12.5 mg  Every 6 Hours PRN      03/24/20 1450    03/24/20 1450  promethazine (PHENERGAN) suppository 12.5 mg  Every 6 Hours PRN      03/24/20 1450    03/24/20 1450  promethazine (PHENERGAN)  tablet 12.5 mg  Every 6 Hours PRN      03/24/20 1450    03/24/20 1448  Tissue Pathology Exam  Once      03/24/20 1447    03/24/20 1112  NPO Diet NPO Except: Ice Chips  Diet Effective Now,   Status:  Canceled      03/24/20 1112    03/24/20 1030  oxytocin in sodium chloride (PITOCIN) 30 UNIT/500ML infusion solution  Titrated      03/24/20 0936    03/24/20 0930  Sod Citrate-Citric Acid (BICITRA) solution 30 mL  Once      03/24/20 0835    03/24/20 0930  ropivacaine (NAROPIN) 0.2 % injection  Continuous      03/24/20 0835    03/24/20 0900  sodium chloride 0.9 % flush 3 mL  Every 12 Hours Scheduled      03/24/20 0325    03/24/20 0836  Vital Signs Per Anesthesia Guidelines  Continuous     Comments:  Every 3 Minutes x 20 Minutes following epidural dosing, then if stable every 15 Minutes    03/24/20 0835    03/24/20 0836  Start IV (16 or 18 Gauge)  Once      03/24/20 0835    03/24/20 0836  Fetal Heart Rate Monitor  Once      03/24/20 0835    03/24/20 0836  Nurse or Anesthesiologist to Remain With Patient for 15 Minutes Following Dosing  Continuous      03/24/20 0835    03/24/20 0836  Facilitate Maternal Position on Side & Maintain Uterine Displacement  Continuous      03/24/20 0835    03/24/20 0836  Consult Anesthesia Prior to Changing Epidural Infusion / Rate  Continuous      03/24/20 0835    03/24/20 0836  Notify physician for the following conditions:  Until Discontinued      03/24/20 0835    03/24/20 0835  lactated ringers bolus 1,000 mL  As Needed      03/24/20 0835    03/24/20 0835  ePHEDrine Sulfate 25 MG/5ML syringe 10 mg  Every 10 Minutes PRN      03/24/20 0835    03/24/20 0835  diphenhydrAMINE (BENADRYL) injection 12.5 mg  Every 8 Hours PRN      03/24/20 0835    03/24/20 0835  ondansetron (ZOFRAN) injection 4 mg  Once As Needed      03/24/20 0835    03/24/20 0835  famotidine (PEPCID) injection 20 mg  Once As Needed      03/24/20 0835    03/24/20 0810  penicillin G in iso-osmotic dextrose IVPB 3 million units  (premix)  Every 4 Hours,   Status:  Discontinued      20 0325    20 0445  nicotine (NICODERM CQ) 14 MG/24HR patch 1 patch  Every 24 Hours Scheduled      20 0344    20 0415  lactated ringers bolus 1,000 mL  Once      20 0325    20 0415  lactated ringers infusion  Continuous      20 0325    20 0415  mineral oil liquid 30 mL  Once      20 0325    20 0415  penicillin g 5 mu/100 mL 0.9% NS IVPB (mbp)  Once      20 0325    20 0325  promethazine (PHENERGAN) injection 12.5 mg  Every 6 Hours PRN      20 0325    20 0325  promethazine (PHENERGAN) injection 12.5 mg  Every 6 Hours PRN      20 0325    20 0325  promethazine (PHENERGAN) suppository 12.5 mg  Every 6 Hours PRN      20 0325    20 0325  promethazine (PHENERGAN) tablet 12.5 mg  Every 6 Hours PRN      20 0325    20 0325  ondansetron (ZOFRAN) tablet 4 mg  Every 6 Hours PRN      20 0325    20 0325  ondansetron (ZOFRAN) injection 4 mg  Every 6 Hours PRN      20 0325    20 0325  butorphanol (STADOL) injection 2 mg  Every 2 Hours PRN      20 0325    20 0325  acetaminophen (TYLENOL) tablet 650 mg  Every 4 Hours PRN      20 0325    20 0325  Code Status and Medical Interventions:  Continuous      20 0325    20 0325  Vital Signs Per hospital policy  Per Hospital Policy      20 0325    20 0325  Up as Tolerated  Until Discontinued      20 0325    20 0325  Initiate Group Beta Strep (GBS) Prophylaxis Protocol, If Criteria Met  Continuous     Comments:  NO TREATMENT RECOMMENDED IF: 1)  Maternal GBS status known negative 2)  Scheduled  birth with intact membranes, not in labor.  3 ) Maternal GBS unknown, no risk factors.   TREAT WITH ANTIBIOTICS IF:  1)  Maternal GBS status is known postive.  2)  Maternal GBS status unknown with these risk factors:  a)  Previous infant affected  by GBS infection.  b)  GBS urinary tract infection (UTI) or bacteruria during pregnancy  c)  Unexplained maternal fever in labor (greater than or equal to 100.4F or 38.0C)  d)  Prolonged rupture of the membranes greater than or equal to 18 hours.  e)  Gestational age less than 37 weeks.    03/24/20 0325    03/24/20 0325  Keep exams to a minimum on patients with SROM  Until Discontinued      03/24/20 0325 03/24/20 0325  Continuous Fetal Monitoring With NST on Admission and Prior to Initiation of Oxytocin.  Per Order Details     Comments:  Continuous Fetal Monitoring With NST on Admission & Prior to Initiation of Oxytocin.    03/24/20 0325    03/24/20 0325  External Uterine Contraction Monitoring  Per Hospital Policy      03/24/20 0325    03/24/20 0325  Notify Physician (specified)  Until Discontinued      03/24/20 0325 03/24/20 0325  Notify physician for hyperstimulus (per hospital algorithm)  Until Discontinued      03/24/20 0325 03/24/20 0325  Notify physician if membranes ruptured, bleeding greater than 1 pad an hour, fetal heart tone abnormality, and severe pain  Until Discontinued      03/24/20 0325    03/24/20 0325  Insert Peripheral IV  Once      03/24/20 0325    03/24/20 0325  Saline Lock & Maintain IV Access  Continuous      03/24/20 0325    03/24/20 0325  Admit To Obstetrics Inpatient  Once      03/24/20 0325    03/24/20 0325  VTE Prophylaxis Not Indicated: Reduced Mobility (3); </= 3 (Low Risk)  Once      03/24/20 0325    03/24/20 0325  Diet Clear Liquid  Diet Effective Now,   Status:  Canceled      03/24/20 0325    03/24/20 0324  lidocaine PF 1% (XYLOCAINE) injection 5 mL  As Needed      03/24/20 0325    03/24/20 0324  sodium chloride 0.9 % flush 10 mL  As Needed      03/24/20 0325    03/24/20 0303  Buprenorphine Confirm, Urine - Urine, Clean Catch  Once      03/24/20 0302    03/24/20 0300  lactated ringers infusion  Continuous      03/24/20 0207    03/24/20 0232  Urine Drug Screen - Urine, Clean  Catch  Once      03/24/20 0231    03/24/20 0231  Preeclampsia Panel  STAT      03/24/20 0231    03/24/20 0207  CBC (No Diff)  STAT      03/24/20 0207    03/24/20 0207  Type & Screen  STAT      03/24/20 0207    Unscheduled  Position change  As Needed     Comments:  For intra-uterine resuscitation for hypertonus, hypertstimulation, or non-reassuring fetal status    03/24/20 0325    Unscheduled  Up with Assistance  As Needed      03/24/20 1450    Unscheduled  Apply Ice to Perineum  As Needed      03/24/20 1450    Unscheduled  Bladder Assessment  As Needed      03/24/20 1450    --  buprenorphine (SUBUTEX) 2 MG sublingual tablet SL tablet  Daily      03/24/20 0156    --  Prenatal Vit-Fe Fumarate-FA (PRENATAL 27-1) 27-1 MG tablet tablet  Daily      03/24/20 0156    Signed and Held  buprenorphine (SUBUTEX) SL tablet 1 mg  Daily      Signed and Held    Signed and Held  Code Status and Medical Interventions:  Continuous      Signed and Held    Signed and Held  Vital Signs Per Hospital Policy  Per Hospital Policy      Signed and Held    Signed and Held  Up Ad Anais  Until Discontinued      Signed and Held    Signed and Held  Up with Assistance  As Needed      Signed and Held    Signed and Held  Ambulate Patient  Every Shift      Signed and Held    Signed and Held  Fundal and Lochia Check  Per Hospital Policy     Comments:  Q 15 min x 4, Q 30 min x 2, then Q Shift    Signed and Held    Signed and Held  RN to Assess Rh Status & Place RhIG Evaluation Order if Indicated  Continuous      Signed and Held    Signed and Held  Bladder Assessment  Per Order Details     Comments:  Postpartum 1) Upon Admission to Unit & Every 4 Hours PRN Until Voiding. 2) Out of Bed to Void in 8 Hours.    Signed and Held    Signed and Held  Straight Cath  Per Order Details     Comments:  Postpartum: If Distended & Unable to Void, May Repeat Once.    Signed and Held    Signed and Held  Indwelling Urinary Catheter  Per Order Details     Comments:  Postpartum  : After Straight Cathed x2 or if Greater Than 1000mL Residual, Insert Indwelling Urinary Catheter Until Further MD Order.    Signed and Held    Signed and Held  Remove Vaginal Packing  Once      Signed and Held    Signed and Held  Apply Ice to Perineum  As Needed     Comments:  For 20 min q 2 hrs    Signed and Held    Signed and Held  Waffle Cushion  As Needed     Comments:  For perineal discomfort    Signed and Held    Signed and Held  Donut Ring  As Needed     Comments:  For perineal pain    Signed and Held    Signed and Held  Sitz Bath  3 Times Daily     Comments:  PRN    Signed and Held    Signed and Held  Kpad  As Needed     Comments:  For pain    Signed and Held    Signed and Held  Warm compress  As Needed      Signed and Held    Signed and Held  Breast pump to bed  Once      Signed and Held    Signed and Held  Apply ace wrap, tight bra, or binder  As Needed      Signed and Held    Signed and Held  Apply ice packs  As Needed      Signed and Held    Signed and Held  Notify Physician  Until Discontinued      Signed and Held    Signed and Held  Diet Regular  Diet Effective Now      Signed and Held    Signed and Held  If indicated -- Please administer RH Immunoglobulin based on results of cord blood evaluation and fetal screen lab tests, pharmacy to dispense  Continuous     Comments:  See process instructions for reference range details.    Signed and Held    Signed and Held  CBC & Differential  Timed     Comments:  Postpartum Day 1      Signed and Held    Signed and Held  sodium chloride 0.9 % flush 1-10 mL  As Needed      Signed and Held    Signed and Held  acetaminophen (TYLENOL) tablet 650 mg  Every 4 Hours PRN      Signed and Held    Signed and Held  ibuprofen (ADVIL,MOTRIN) tablet 600 mg  Every 6 Hours PRN      Signed and Held    Signed and Held  docusate sodium (COLACE) capsule 100 mg  2 Times Daily      Signed and Held    Signed and Held  ondansetron (ZOFRAN) tablet 4 mg  Every 6 Hours PRN      Signed and  Held    Signed and Held  ondansetron (ZOFRAN) injection 4 mg  Every 6 Hours PRN      Signed and Held    Signed and Held  promethazine (PHENERGAN) tablet 12.5 mg  Every 4 Hours PRN      Signed and Held    Signed and Held  promethazine (PHENERGAN) injection 12.5 mg  Every 4 Hours PRN      Signed and Held    Signed and Held  lanolin cream  Every 1 Hour PRN      Signed and Held    Signed and Held  benzocaine-lanolin-aloe vera (DERMOPLAST) 20-0.5 % topical spray  As Needed      Signed and Held    Signed and Held  witch hazel-glycerin (TUCKS) pad 1 pad  As Needed      Signed and Held    Signed and Held  hydrocortisone (ANUSOL-HC) 2.5 % rectal cream 1 application  As Needed      Signed and Held    Signed and Held  simethicone (MYLICON) chewable tablet 80 mg  4 Times Daily PRN      Signed and Held                   Operative/Procedure Notes (last 24 hours) (Notes from 20 1614 through 20 1614)      Lorena Casper MD at 20 1455          [facility]    Vaginal Delivery Note    [facility]      Patient name:  Georgie Bean  : 1980  MRN: [id number]  CSN: [id number]  Date of Service:  20      Patient Active Problem List   Diagnosis   •  premature rupture of membranes (PPROM) with unknown onset of labor     Current Gestational Age:  35w0d    Delivery details     Delivery: Vaginal, Spontaneous     YOB: 2019    Time of Birth: 2:41 PM      Anesthesia: Epidural     Delivering clinician: Lorean Casper    Forceps?   No   Vacuum? No    Shoulder dystocia present: No      Delivery narrative:  Patient delivered a VMI over intact perineum.  Cord milked x 4, doubly clamped and cut. Baby placed on maternal abdomen with vigorous cry.  Placenta delivered spontaneously and intact with 3 a vessel cord.  Uterus firm on bimanual massage.     Infant details    Findings: male  infant     Infant observations: Weight: No birth weight on file.   Length:    in   Apgars:    @ 1 minute /        @ 5 minutes         Placenta, Cord, and Fluid    Placenta delivered  Spontaneous  at   3/24/2020  2:45 PM     Cord: 3 vessels  present.   Nuchal Cord?  no   Cord blood obtained: Yes      Episiotomy, Laceration, and Repair            Estimated Blood Loss:   250 mL           Complications  none    Disposition  Mother to Mother Baby/Postpartum  in stable condition currently.  Baby to NBN  in stable condition currently.      Lorena Casper MD  03/24/20  14:57      Electronically signed by Lorena Casper MD at 03/24/20 1458       Physician Progress Notes (last 24 hours) (Notes from 03/23/20 1614 through 03/24/20 1614)    No notes of this type exist for this encounter.

## 2020-03-24 NOTE — PROGRESS NOTES
[facility]  Obstetric Progress Note    Subjective     Patient:    Resting without complaints, comfortable with epidural    Objective     Vital Signs Range for the last 24 hours  Temp:  [98.5 °F (36.9 °C)-99 °F (37.2 °C)] 98.5 °F (36.9 °C)   Temp src: Oral   BP: (101-184)/(55-92) 101/55   Heart Rate:  [] 92   Resp:  [16-18] 16               Weight:  [59 kg (130 lb)] 59 kg (130 lb)       Intake/Output this shift:    No intake/output data recorded.    Physical Exam:      Abdomen Abdominal exam: soft, nontender, nondistended, no masses or organomegaly.   Extremities Exam of extremities: peripheral pulses normal, no pedal edema, no clubbing or cyanosis     Presentation: vertex   Cervix: Exam by: Method: sterile exam per CNM   Dilation:  5.5   Effacement: Cervical Effacement: 80%   Station:  -1         Fetal Heart Rate Assessment   Method: Fetal HR Assessment Method: external   Beats/min: Fetal HR (beats/min): 145   Baseline: Fetal Heart Baseline Rate: normal range   Varibility: Fetal HR Variability: moderate (amplitude range 6 to 25 bpm)   Accels: Fetal HR Accelerations: greater than/equal to 15 bpm, lasting at least 15 seconds   Decels: Fetal HR Decelerations: early, variable   Tracing Category:       Uterine Assessment   Method: Method: external tocotransducer   Frequency (min): Contraction Frequency (Minutes): 2-3   Ctx Count in 10 min:     Duration:     Intensity: Contraction Intensity: moderate by palpation   Intensity by IUPC:     Resting Tone: Uterine Resting Tone: soft by palpation   Resting Tone by IUPC:     Kvng Units:         Assessment/Plan        premature rupture of membranes (PPROM) with unknown onset of labor        Assessment:  1.  Intrauterine pregnancy at 35w0d weeks gestation with reactive fetal status.    2.  labor  with ROM  3.  GBS status: unknown  Plan:  1. Continue pitocin augmentation, plan for drt at delivery given late and limited PNC x 1 visit at approximately 32 weeks.    2.  Repeat SVE every 2-4 hours or prn  3.   Plan of care has been reviewed with patient and family  4.  Risks, benefits of treatment plan have been discussed.  5.  All questions have been answered.        Damaris Calhoun CNM  3/24/2020  13:58

## 2020-03-25 LAB
BASOPHILS # BLD AUTO: 0.08 10*3/MM3 (ref 0–0.2)
BASOPHILS NFR BLD AUTO: 0.6 % (ref 0–1.5)
DEPRECATED RDW RBC AUTO: 48.8 FL (ref 37–54)
EOSINOPHIL # BLD AUTO: 0.23 10*3/MM3 (ref 0–0.4)
EOSINOPHIL NFR BLD AUTO: 1.8 % (ref 0.3–6.2)
ERYTHROCYTE [DISTWIDTH] IN BLOOD BY AUTOMATED COUNT: 13.5 % (ref 12.3–15.4)
HBV SURFACE AG SERPL QL IA: NORMAL
HCT VFR BLD AUTO: 35.1 % (ref 34–46.6)
HCV AB SER DONR QL: NORMAL
HGB BLD-MCNC: 11.6 G/DL (ref 12–15.9)
HIV1+2 AB SER QL: NORMAL
IMM GRANULOCYTES # BLD AUTO: 0.09 10*3/MM3 (ref 0–0.05)
IMM GRANULOCYTES NFR BLD AUTO: 0.7 % (ref 0–0.5)
LYMPHOCYTES # BLD AUTO: 2.78 10*3/MM3 (ref 0.7–3.1)
LYMPHOCYTES NFR BLD AUTO: 21.4 % (ref 19.6–45.3)
MCH RBC QN AUTO: 32.9 PG (ref 26.6–33)
MCHC RBC AUTO-ENTMCNC: 33 G/DL (ref 31.5–35.7)
MCV RBC AUTO: 99.4 FL (ref 79–97)
MONOCYTES # BLD AUTO: 0.97 10*3/MM3 (ref 0.1–0.9)
MONOCYTES NFR BLD AUTO: 7.5 % (ref 5–12)
NEUTROPHILS # BLD AUTO: 8.84 10*3/MM3 (ref 1.7–7)
NEUTROPHILS NFR BLD AUTO: 68 % (ref 42.7–76)
NRBC BLD AUTO-RTO: 0 /100 WBC (ref 0–0.2)
PLATELET # BLD AUTO: 323 10*3/MM3 (ref 140–450)
PMV BLD AUTO: 9.1 FL (ref 6–12)
RBC # BLD AUTO: 3.53 10*6/MM3 (ref 3.77–5.28)
REF LAB TEST METHOD: NORMAL
RPR SER QL: NORMAL
RUBV IGG SERPL IA-ACNC: POSITIVE
WBC NRBC COR # BLD: 12.99 10*3/MM3 (ref 3.4–10.8)

## 2020-03-25 PROCEDURE — 86762 RUBELLA ANTIBODY: CPT | Performed by: NURSE PRACTITIONER

## 2020-03-25 PROCEDURE — G0432 EIA HIV-1/HIV-2 SCREEN: HCPCS | Performed by: NURSE PRACTITIONER

## 2020-03-25 PROCEDURE — 85025 COMPLETE CBC W/AUTO DIFF WBC: CPT | Performed by: NURSE PRACTITIONER

## 2020-03-25 PROCEDURE — 86803 HEPATITIS C AB TEST: CPT | Performed by: NURSE PRACTITIONER

## 2020-03-25 PROCEDURE — 86592 SYPHILIS TEST NON-TREP QUAL: CPT | Performed by: NURSE PRACTITIONER

## 2020-03-25 PROCEDURE — 87340 HEPATITIS B SURFACE AG IA: CPT | Performed by: NURSE PRACTITIONER

## 2020-03-25 RX ORDER — NICOTINE 21 MG/24HR
1 PATCH, TRANSDERMAL 24 HOURS TRANSDERMAL
Status: DISCONTINUED | OUTPATIENT
Start: 2020-03-25 | End: 2020-03-26 | Stop reason: HOSPADM

## 2020-03-25 RX ADMIN — IBUPROFEN 600 MG: 600 TABLET, FILM COATED ORAL at 00:14

## 2020-03-25 RX ADMIN — DOCUSATE SODIUM 100 MG: 100 CAPSULE, LIQUID FILLED ORAL at 19:47

## 2020-03-25 RX ADMIN — IBUPROFEN 600 MG: 600 TABLET, FILM COATED ORAL at 06:04

## 2020-03-25 RX ADMIN — DOCUSATE SODIUM 100 MG: 100 CAPSULE, LIQUID FILLED ORAL at 00:16

## 2020-03-25 RX ADMIN — BUPRENORPHINE HCL 1 MG: 2 TABLET SUBLINGUAL at 09:36

## 2020-03-25 RX ADMIN — DOCUSATE SODIUM 100 MG: 100 CAPSULE, LIQUID FILLED ORAL at 09:36

## 2020-03-25 RX ADMIN — NICOTINE 1 PATCH: 14 PATCH, EXTENDED RELEASE TRANSDERMAL at 19:47

## 2020-03-25 NOTE — PROGRESS NOTES
[facility]  Vaginal Delivery Progress Note    Subjective     Doing well, pain controlled, lochia less than menses. Denies HA, vision changes and epigastric pain.       Objective     Vital Signs Range for the last 24 hours  Temperature: Temp:  [97.8 °F (36.6 °C)-98.7 °F (37.1 °C)] 97.8 °F (36.6 °C)   Temp Source: Temp src: Oral   BP: BP: (101-171)/(55-91) 152/83   Pulse: Heart Rate:  [] 88   Respirations: Resp:  [16-18] 16   SPO2:     O2 Amount (l/min):     O2 Devices           Physical Exam:  General:  no acute distresss.  Abdomen: Soft, non-tender, fundus firm  Extremities: normal, atraumatic, no cyanosis, and trace edema.       Lab results reviewed:  Yes    Lab Results   Component Value Date    WBC 14.42 (H) 2020    HGB 12.8 2020    HCT 39.0 2020    MCV 96.5 2020     2020         Assessment/Plan        premature rupture of membranes (PPROM) with unknown onset of labor      Georgie Bean is Day 1  post-partum       Plan:  Continue current care.      Katrina Cole CNM  3/25/2020  09:01

## 2020-03-25 NOTE — ANESTHESIA POSTPROCEDURE EVALUATION
Patient: Georgie Bean    Procedure Summary     Date:  03/24/20 Room / Location:      Anesthesia Start:  0832 Anesthesia Stop:  1441    Procedure:  LABOR ANALGESIA Diagnosis:      Scheduled Providers:   Provider:  Karri Barajas MD    Anesthesia Type:  epidural ASA Status:  3          Anesthesia Type: epidural    Vitals  Vitals Value Taken Time   /82 3/24/2020 10:30 PM   Temp 97.9 °F (36.6 °C) 3/24/2020 10:30 PM   Pulse 89 3/24/2020 10:30 PM   Resp 18 3/24/2020 10:30 PM   SpO2             Post Anesthesia Care and Evaluation    Patient location during evaluation: bedside  Patient participation: complete - patient participated  Level of consciousness: awake and alert  Pain management: adequate  Airway patency: patent  Anesthetic complications: No anesthetic complications    Cardiovascular status: acceptable  Respiratory status: acceptable  Hydration status: acceptable  Post Neuraxial Block status: Motor and sensory function returned to baseline and No signs or symptoms of PDPH

## 2020-03-26 VITALS
RESPIRATION RATE: 16 BRPM | HEIGHT: 62 IN | DIASTOLIC BLOOD PRESSURE: 84 MMHG | BODY MASS INDEX: 23.92 KG/M2 | WEIGHT: 130 LBS | TEMPERATURE: 98 F | SYSTOLIC BLOOD PRESSURE: 126 MMHG | HEART RATE: 75 BPM

## 2020-03-26 LAB
ALP SERPL-CCNC: 128 U/L (ref 39–117)
ALT SERPL W P-5'-P-CCNC: 16 U/L (ref 1–33)
AST SERPL-CCNC: 22 U/L (ref 1–32)
BILIRUB SERPL-MCNC: <0.2 MG/DL (ref 0.2–1.2)
CREAT BLD-MCNC: 0.4 MG/DL (ref 0.57–1)
LDH SERPL-CCNC: 230 U/L (ref 135–214)
URATE SERPL-MCNC: 3.7 MG/DL (ref 2.4–5.7)

## 2020-03-26 PROCEDURE — 82247 BILIRUBIN TOTAL: CPT | Performed by: NURSE PRACTITIONER

## 2020-03-26 PROCEDURE — 84450 TRANSFERASE (AST) (SGOT): CPT | Performed by: NURSE PRACTITIONER

## 2020-03-26 PROCEDURE — 82565 ASSAY OF CREATININE: CPT | Performed by: NURSE PRACTITIONER

## 2020-03-26 PROCEDURE — 83615 LACTATE (LD) (LDH) ENZYME: CPT | Performed by: NURSE PRACTITIONER

## 2020-03-26 PROCEDURE — 84075 ASSAY ALKALINE PHOSPHATASE: CPT | Performed by: NURSE PRACTITIONER

## 2020-03-26 PROCEDURE — 84460 ALANINE AMINO (ALT) (SGPT): CPT | Performed by: NURSE PRACTITIONER

## 2020-03-26 PROCEDURE — 84550 ASSAY OF BLOOD/URIC ACID: CPT | Performed by: NURSE PRACTITIONER

## 2020-03-26 RX ORDER — IBUPROFEN 600 MG/1
600 TABLET ORAL EVERY 6 HOURS PRN
Qty: 60 TABLET | Refills: 1 | Status: SHIPPED | OUTPATIENT
Start: 2020-03-26

## 2020-03-26 RX ADMIN — BUPRENORPHINE HCL 1 MG: 2 TABLET SUBLINGUAL at 09:05

## 2020-03-26 RX ADMIN — DOCUSATE SODIUM 100 MG: 100 CAPSULE, LIQUID FILLED ORAL at 09:04

## 2020-03-26 NOTE — DISCHARGE SUMMARY
[facility]  Vaginal delivery discharge summary      Patient: Georgie Bean      MR#:[id number]  Admission  Diagnosis:   Patient Active Problem List   Diagnosis   •  premature rupture of membranes (PPROM) with unknown onset of labor     Discharge Diagnosis: No diagnosis found.      Date of Admission: 3/24/2020  Date of Discharge:  3/26/2020    Procedures:  Vaginal, Spontaneous     3/24/2020    2:41 PM      Service:  Obstetrics    Hospital Course:  Patient underwent vaginal delivery and remained in the hospital for 2 days.  During that time she remained afebrile and hemodynamically stable.  On the day of discharge, she was eating, ambulating and voiding without difficulty.      Labs:    Lab Results   Component Value Date    WBC 12.99 (H) 2020    HGB 11.6 (L) 2020    HCT 35.1 2020    MCV 99.4 (H) 2020     2020    URICACID 3.7 2020    AST 22 2020    ALT 16 2020     (H) 2020     Results from last 7 days   Lab Units 20  0247   ABO TYPING  AB   RH TYPING  Positive   ANTIBODY SCREEN  Negative       Discharge Medications     Discharge Medications      New Medications      Instructions Start Date   ibuprofen 600 MG tablet  Commonly known as:  ADVIL,MOTRIN   600 mg, Oral, Every 6 Hours PRN         Continue These Medications      Instructions Start Date   buprenorphine 2 MG sublingual tablet SL tablet  Commonly known as:  SUBUTEX   1 mg, Sublingual, Daily      Prenatal 27-1 27-1 MG tablet tablet   1 tablet, Oral, Daily             Discharge Disposition:  To Home    Discharge Condition:  Stable    Discharge Diet: Regular    Activity at Discharge: Pelvic rest    Follow-up Appointments  Follow up with LW in 4-6 weeks.     Michael Bagley CNM  20  08:48

## 2020-03-26 NOTE — PAYOR COMM NOTE
"Chelsi Bean (39 y.o. Female)     Auth#[id number]    Discharged home 3/26/2020.    From: Zee Tyler  #[phone]  Fax#[phone]      Date of Birth Social Security Number Address Home Phone MRN    1980  [address]  [place] [id number] [phone] [id number]    [employer] Mar[employer] Status          [employer]        Admission Date Admission Type Admitting Provider Attending Provider Department, Room/Bed    3/24/20 Elective Brooke Tariq [facility] MOTHER BABY 4A, N408/1    Discharge Date Discharge Disposition Discharge Destination        3/26/2020 Home or Self Care              Attending Provider:  (none)   Allergies:  No Known Allergies    Isolation:  None   Infection:  None   Code Status:  CPR    Ht:  157.5 cm (62\")   Wt:  59 kg (130 lb)    Admission Cmt:  None   Principal Problem:  None                Active Insurance as of 3/24/2020     Primary Coverage     Payor Plan Insurance Group Employer/Plan Group    [place] [id number]     Payor Plan Address Payor Plan Phone Number Payor Plan Fax Number Effective Dates    [employer] Claims Office - [address] [address] [phone]  1/1/2020 - None Entered    [facility] [id number]       Subscriber Name Subscriber Birth Date Member ID       CHELSI BEAN 1980 [id number]                 Emergency Contacts      (Rel.) Home Phone Work Phone Mobile Phone    ANAYA BEAN (Father) [phone] -- --            Discharge Summary    No notes of this type exist for this encounter.         "

## 2020-03-26 NOTE — DISCHARGE SUMMARY
[facility]  Vaginal delivery discharge summary      Patient: Georgie Bean      MR#:[id number]  Admission  Diagnosis:   Patient Active Problem List   Diagnosis   •  premature rupture of membranes (PPROM) with unknown onset of labor     Discharge Diagnosis: Spontaneous vaginal birth.      Date of Admission: 3/24/2020  Date of Discharge:  3/26/2020    Procedures:  Vaginal, Spontaneous     3/24/2020    2:41 PM      Service:  Obstetrics    Hospital Course:  Patient underwent vaginal delivery and remained in the hospital for 2 days.  During that time she remained afebrile and hemodynamically stable.  On the day of discharge, she was eating, ambulating and voiding without difficulty.  She denies HA, vision changes, and epigastric pain. She is formula feeding.     Labs:    Lab Results   Component Value Date    WBC 12.99 (H) 2020    HGB 11.6 (L) 2020    HCT 35.1 2020    MCV 99.4 (H) 2020     2020    URICACID 3.7 2020    AST 22 2020    ALT 16 2020     (H) 2020     Results from last 7 days   Lab Units 20  0247   ABO TYPING  AB   RH TYPING  Positive   ANTIBODY SCREEN  Negative       Discharge Medications     Discharge Medications      New Medications      Instructions Start Date   ibuprofen 600 MG tablet  Commonly known as:  ADVIL,MOTRIN   600 mg, Oral, Every 6 Hours PRN         Continue These Medications      Instructions Start Date   buprenorphine 2 MG sublingual tablet SL tablet  Commonly known as:  SUBUTEX   1 mg, Sublingual, Daily      Prenatal 27-1 27-1 MG tablet tablet   1 tablet, Oral, Daily             Discharge Disposition:  To Home    Discharge Condition:  Stable. Borderline BPs with normal PEP    Discharge Diet: Regular    Activity at Discharge: Pelvic rest. Call office if persistent HA, vision changes, or epigastric pain. Monitor BP at home and call if >150/100.     Follow-up Appointments  Follow up with [facility] in 1 week for BP check.      Michael Bagley CNM  03/26/20  19:50

## 2020-03-26 NOTE — PROGRESS NOTES
3/26/2020  PPD #2    Subjective   Georgie feels well.  Patient describes her lochia as less than menses.  Pain is well controlled  She denies HA, vision changes and epigastric pain.   She is formula feeding.        Objective   Temp: Temp:  [98 °F (36.7 °C)-98.4 °F (36.9 °C)] 98 °F (36.7 °C) Temp src: Oral   BP: BP: (126-145)/(84-91) 126/84        Pulse: Heart Rate:  [75-83] 75  RR: Resp:  [16] 16    General:  No acute distress   Abdomen: Fundus firm and beneath umbilicus   Pelvis: deferred     Lab Results   Component Value Date    WBC 12.99 (H) 03/25/2020    HGB 11.6 (L) 03/25/2020    HCT 35.1 03/25/2020    MCV 99.4 (H) 03/25/2020     03/25/2020    HEPBSAG Non-Reactive 03/25/2020    AST 22 03/26/2020    URICACID 3.7 03/26/2020       Assessment  1. PPD# 2 after vaginal delivery  2.   Borderline BPs with normal labs    Plan  1. Discharge to home  2. Follow up with LW  in 1 week for BP check  3.   Call office if persistent HA, vision changes, or epigastric pain. Monitor BP at home and call if >150/100.   4.   Prescriptions for Motrin prescribed and advised on weaning.  5.   Advised no tampons, intercourse, or tub baths for 6 weeks.       This note has been electronically signed.    Michael Bagley CNM  08:48  March 26, 2020

## 2020-03-27 LAB
CYTO UR: NORMAL
LAB AP CASE REPORT: NORMAL
LAB AP CLINICAL INFORMATION: NORMAL
LAB AP DIAGNOSIS COMMENT: NORMAL
PATH REPORT.FINAL DX SPEC: NORMAL
PATH REPORT.GROSS SPEC: NORMAL